# Patient Record
Sex: FEMALE | Race: ASIAN | NOT HISPANIC OR LATINO | Employment: FULL TIME | URBAN - METROPOLITAN AREA
[De-identification: names, ages, dates, MRNs, and addresses within clinical notes are randomized per-mention and may not be internally consistent; named-entity substitution may affect disease eponyms.]

---

## 2017-03-31 ENCOUNTER — COMMUNICATION - HEALTHEAST (OUTPATIENT)
Dept: FAMILY MEDICINE | Facility: CLINIC | Age: 32
End: 2017-03-31

## 2017-06-20 ENCOUNTER — COMMUNICATION - HEALTHEAST (OUTPATIENT)
Dept: FAMILY MEDICINE | Facility: CLINIC | Age: 32
End: 2017-06-20

## 2018-04-13 ENCOUNTER — OFFICE VISIT - HEALTHEAST (OUTPATIENT)
Dept: FAMILY MEDICINE | Facility: CLINIC | Age: 33
End: 2018-04-13

## 2018-04-13 DIAGNOSIS — H66.001 ACUTE SUPPURATIVE OTITIS MEDIA OF RIGHT EAR WITHOUT SPONTANEOUS RUPTURE OF TYMPANIC MEMBRANE, RECURRENCE NOT SPECIFIED: ICD-10-CM

## 2018-04-13 DIAGNOSIS — H61.22 IMPACTED CERUMEN OF LEFT EAR: ICD-10-CM

## 2018-07-23 ENCOUNTER — COMMUNICATION - HEALTHEAST (OUTPATIENT)
Dept: FAMILY MEDICINE | Facility: CLINIC | Age: 33
End: 2018-07-23

## 2019-01-16 ENCOUNTER — COMMUNICATION - HEALTHEAST (OUTPATIENT)
Dept: FAMILY MEDICINE | Facility: CLINIC | Age: 34
End: 2019-01-16

## 2019-07-04 ENCOUNTER — COMMUNICATION - HEALTHEAST (OUTPATIENT)
Dept: FAMILY MEDICINE | Facility: CLINIC | Age: 34
End: 2019-07-04

## 2019-07-08 ENCOUNTER — COMMUNICATION - HEALTHEAST (OUTPATIENT)
Dept: FAMILY MEDICINE | Facility: CLINIC | Age: 34
End: 2019-07-08

## 2019-08-16 ENCOUNTER — OFFICE VISIT - HEALTHEAST (OUTPATIENT)
Dept: FAMILY MEDICINE | Facility: CLINIC | Age: 34
End: 2019-08-16

## 2019-08-16 DIAGNOSIS — D48.5 NEOPLASM OF UNCERTAIN BEHAVIOR OF SKIN: ICD-10-CM

## 2019-08-16 DIAGNOSIS — L81.1 MELASMA: ICD-10-CM

## 2019-08-16 DIAGNOSIS — Z12.4 SCREENING FOR MALIGNANT NEOPLASM OF CERVIX: ICD-10-CM

## 2019-08-16 DIAGNOSIS — Z80.0 FAMILY HISTORY OF COLON CANCER: ICD-10-CM

## 2019-08-16 DIAGNOSIS — Z00.00 WELL ADULT EXAM: ICD-10-CM

## 2019-08-16 DIAGNOSIS — L70.8 OTHER ACNE: ICD-10-CM

## 2019-08-16 LAB — TSH SERPL DL<=0.005 MIU/L-ACNC: 0.53 UIU/ML (ref 0.3–5)

## 2019-08-16 ASSESSMENT — MIFFLIN-ST. JEOR: SCORE: 1278.29

## 2019-08-19 ENCOUNTER — RECORDS - HEALTHEAST (OUTPATIENT)
Dept: ADMINISTRATIVE | Facility: OTHER | Age: 34
End: 2019-08-19

## 2019-08-19 LAB
25(OH)D3 SERPL-MCNC: 19.1 NG/ML (ref 30–80)
25(OH)D3 SERPL-MCNC: 19.1 NG/ML (ref 30–80)
CHOLEST SERPL-MCNC: 211 MG/DL
HDL SERPL QN: 8.9 NM
HDL SERPL-SCNC: 38.8 UMOL/L
HDLC SERPL-MCNC: 61 MG/DL (ref 40–59)
HLD.LARGE SERPL-SCNC: 6.1 UMOL/L
LDL SERPL QN: 21.3 NM
LDL SERPL-SCNC: 1263 NMOL/L
LDL SMALL SERPL-SCNC: 336 NMOL/L
LDLC SERPL CALC-MCNC: 133 MG/DL
PATHOLOGY STUDY: ABNORMAL
TRIGL SERPL-MCNC: 87 MG/DL (ref 30–149)
VLDL LARGE SERPL-SCNC: 2.5 NMOL/L
VLDL SERPL QN: 44.7 NM

## 2019-08-20 LAB — THYROID PEROXIDASE ANTIBODIES - HISTORICAL: <3 IU/ML (ref 0–5.6)

## 2019-08-21 LAB
HPV SOURCE: NORMAL
HUMAN PAPILLOMA VIRUS 16 DNA: NEGATIVE
HUMAN PAPILLOMA VIRUS 18 DNA: NEGATIVE
HUMAN PAPILLOMA VIRUS FINAL DIAGNOSIS: NORMAL
HUMAN PAPILLOMA VIRUS OTHER HR: NEGATIVE
SPECIMEN DESCRIPTION: NORMAL

## 2019-08-22 ENCOUNTER — COMMUNICATION - HEALTHEAST (OUTPATIENT)
Dept: FAMILY MEDICINE | Facility: CLINIC | Age: 34
End: 2019-08-22

## 2019-08-27 ENCOUNTER — COMMUNICATION - HEALTHEAST (OUTPATIENT)
Dept: FAMILY MEDICINE | Facility: CLINIC | Age: 34
End: 2019-08-27

## 2019-09-17 ENCOUNTER — RECORDS - HEALTHEAST (OUTPATIENT)
Dept: ADMINISTRATIVE | Facility: OTHER | Age: 34
End: 2019-09-17

## 2019-11-05 ENCOUNTER — COMMUNICATION - HEALTHEAST (OUTPATIENT)
Dept: FAMILY MEDICINE | Facility: CLINIC | Age: 34
End: 2019-11-05

## 2019-11-05 DIAGNOSIS — Z78.9 USES BIRTH CONTROL: ICD-10-CM

## 2019-12-18 ENCOUNTER — RECORDS - HEALTHEAST (OUTPATIENT)
Dept: ADMINISTRATIVE | Facility: OTHER | Age: 34
End: 2019-12-18

## 2020-10-30 ENCOUNTER — RECORDS - HEALTHEAST (OUTPATIENT)
Dept: ADMINISTRATIVE | Facility: OTHER | Age: 35
End: 2020-10-30

## 2020-11-13 ENCOUNTER — COMMUNICATION - HEALTHEAST (OUTPATIENT)
Dept: FAMILY MEDICINE | Facility: CLINIC | Age: 35
End: 2020-11-13

## 2020-11-16 ENCOUNTER — COMMUNICATION - HEALTHEAST (OUTPATIENT)
Dept: FAMILY MEDICINE | Facility: CLINIC | Age: 35
End: 2020-11-16

## 2020-11-16 DIAGNOSIS — Z11.52 ENCOUNTER FOR SCREENING LABORATORY TESTING FOR COVID-19 VIRUS: ICD-10-CM

## 2020-12-16 ENCOUNTER — AMBULATORY - HEALTHEAST (OUTPATIENT)
Dept: LAB | Facility: CLINIC | Age: 35
End: 2020-12-16

## 2020-12-16 DIAGNOSIS — Z11.52 ENCOUNTER FOR SCREENING LABORATORY TESTING FOR COVID-19 VIRUS: ICD-10-CM

## 2020-12-17 ENCOUNTER — COMMUNICATION - HEALTHEAST (OUTPATIENT)
Dept: FAMILY MEDICINE | Facility: CLINIC | Age: 35
End: 2020-12-17

## 2020-12-18 ENCOUNTER — COMMUNICATION - HEALTHEAST (OUTPATIENT)
Dept: SCHEDULING | Facility: CLINIC | Age: 35
End: 2020-12-18

## 2020-12-18 ENCOUNTER — COMMUNICATION - HEALTHEAST (OUTPATIENT)
Dept: FAMILY MEDICINE | Facility: CLINIC | Age: 35
End: 2020-12-18

## 2021-01-07 ENCOUNTER — COMMUNICATION - HEALTHEAST (OUTPATIENT)
Dept: FAMILY MEDICINE | Facility: CLINIC | Age: 36
End: 2021-01-07

## 2021-01-14 ENCOUNTER — COMMUNICATION - HEALTHEAST (OUTPATIENT)
Dept: FAMILY MEDICINE | Facility: CLINIC | Age: 36
End: 2021-01-14

## 2021-01-14 ENCOUNTER — OFFICE VISIT - HEALTHEAST (OUTPATIENT)
Dept: FAMILY MEDICINE | Facility: CLINIC | Age: 36
End: 2021-01-14

## 2021-01-14 DIAGNOSIS — L71.0 PERIORAL DERMATITIS: ICD-10-CM

## 2021-01-14 RX ORDER — CLINDAMYCIN PHOSPHATE 10 MG/G
GEL TOPICAL
Qty: 60 G | Refills: 1 | Status: SHIPPED | OUTPATIENT
Start: 2021-01-14

## 2021-01-14 NOTE — ASSESSMENT & PLAN NOTE
"On off for a year  Initially thought is was clinque brand Facial cream  All over my face   Last 12/2020  Removed product and got better  \"little mouth and nose\"    Since irritation resolved   Persisted for months    Tried not tooth paste   Went without applying   Cleanser Salacylic acid did not help   Use   Plan   Clindamycin   Doxycycline     "

## 2021-01-29 ENCOUNTER — COMMUNICATION - HEALTHEAST (OUTPATIENT)
Dept: FAMILY MEDICINE | Facility: CLINIC | Age: 36
End: 2021-01-29

## 2021-01-29 ENCOUNTER — AMBULATORY - HEALTHEAST (OUTPATIENT)
Dept: FAMILY MEDICINE | Facility: CLINIC | Age: 36
End: 2021-01-29

## 2021-01-29 DIAGNOSIS — L30.9 DERMATITIS: ICD-10-CM

## 2021-02-01 ENCOUNTER — COMMUNICATION - HEALTHEAST (OUTPATIENT)
Dept: FAMILY MEDICINE | Facility: CLINIC | Age: 36
End: 2021-02-01

## 2021-02-01 DIAGNOSIS — L30.9 DERMATITIS: ICD-10-CM

## 2021-02-01 RX ORDER — HYDROCORTISONE 2.5 %
CREAM (GRAM) TOPICAL
Qty: 30 G | Refills: 1 | Status: SHIPPED | OUTPATIENT
Start: 2021-02-01

## 2021-05-25 ENCOUNTER — RECORDS - HEALTHEAST (OUTPATIENT)
Dept: ADMINISTRATIVE | Facility: CLINIC | Age: 36
End: 2021-05-25

## 2021-05-30 NOTE — TELEPHONE ENCOUNTER
RN cannot approve Refill Request    RN can NOT refill this medication PCP messaged that patient is overdue for Office Visit and Physical .       Lety Vasquez, [facility] Connection Triage/Med Refill 7/9/2019    Requested Prescriptions   Pending Prescriptions Disp Refills     TRI FEMYNOR 0.18/0.215/0.25 mg-35 mcg (28) Tab tablet [Pharmacy Med Name: TRI FEMYNOR 28 TABLET] 28 tablet 0     Sig: TAKE 1 TABLET DAILY AS DIRECTED. MUST SEE PROVIDER FOR PHYSICAL.       Oral Contraceptives Protocol Failed - 7/8/2019  8:56 PM        Failed - Visit with PCP or prescribing provider visit in last 12 months      Last office visit with prescriber/PCP: Visit date not found OR same dept: Visit date not found OR same specialty: 4/13/2018 David Salmon MD  Last physical: Visit date not found Last MTM visit: Visit date not found   Next visit within 3 mo: Visit date not found  Next physical within 3 mo: Visit date not found  Prescriber OR PCP: Mary Edouard MD  Last diagnosis associated with med order: 1. Contraception  - TRI FEMYNOR 0.18/0.215/0.25 mg-35 mcg (28) Tab tablet [Pharmacy Med Name: TRI FEMYNOR 28 TABLET]; TAKE 1 TABLET DAILY AS DIRECTED. MUST SEE PROVIDER FOR PHYSICAL.  Dispense: 28 tablet; Refill: 0    If protocol passes may refill for 12 months if within 3 months of last provider visit (or a total of 15 months).

## 2021-05-31 ENCOUNTER — RECORDS - HEALTHEAST (OUTPATIENT)
Dept: ADMINISTRATIVE | Facility: CLINIC | Age: 36
End: 2021-05-31

## 2021-05-31 NOTE — PATIENT INSTRUCTIONS - HE
"Follow through with colorectal surgery Dr. Monae Harrell for colorectal screening    See Dr. Serene Saunders dermatology she can evaluate the mole on your upper chest  Start Tri-Odalis apply thin film to the upper lip    Look at [facility] for pricing for Tri-Odalis next line      Focused Nutrition:  Eat Clean and Whole Foods  These are single ingredient foods that possess vital nutrients which can touch and affect our health in a positive manner.            Lean Meats Protein and Fat---- Nuts, Eggs,Veggies, Fish and Lean Meats especially fish and poultry. Meat and Eggs in their natural form have No Sugar.  Try to choose good fat from Fish, Nuts, Avocados, Extra Virgin Olive Oil (not cooked) other vegetables for example. Opt for Plant Proteins and Fats over Animal Fats      For High Protein Eat---- Meats, Fish, Lean Meats, Beans, Nuts, and Quinoa/ other Whole grains    Focus on \"Whole Foods\":  You know what the food is by looking at it and comes from a plant, tree, animal or the sea.  Single source organically grown if possible.    Look for Low Glycemic foods:  Try to Avoid foods with any added sugars and absolutely  no High Fructose Corn Syrup    Lean Meats Protein and Fat: These in their natural form have No Sugar.  Go for the good fat from Fish, Nuts, Avocados, Extra Virgin Olive Oil (not cooked) other vegetables for example. Opt for Plant Proteins and Fats over Animal Fats    Beans are excellent complex carbohydrates with fiber- black, garbanzo, lentil, kidney, lima, Ree, West    Simple flours and breads, sigh,  in general are simple sugars, when processed and should be avoided.  However there are beneficial fibers in wholes grains.  So if choosing, go for the 100% whole-grain Grains --Bran, brown rice, Flax----Fiber offsets glycemic affect    Vegetables: most vegetables are low glycemic with the exception of starchy ones:  potatoes, carrots, corn, and beats    Whole Fruits mostly are low glycemic:  try to eat his " snacks: Try not to pair with fat.  Insulin burns sugar and stores fat as a job     High glycemic fruits: Dates, Watermelon, Figgs, Apricots, Raisins but in moderation OK    For Snacks go for nuts and seeds unless you are instructed not to do so due to another health condition; please although tempting avoid corn chips, jellybeans, pretzels other processed snacks that usually pair sugar and fat.      Probiotics and Prebiotics support digestion and our immune system!    Foods that support this are, among others:    Pros: Kefir, Kombucha, Miso, Pickled Vegetables, Brayan Kraut, Yogurt, Tempeh,     Pre: Asparagus, Bananas, Eggplant, Garlic, Honey, Kefir, Leeks, Legumes, Onions, Peas, Whole Grains, and Yogurt    Nutrients support our cellular machinery:  High nutrient food support this.    Rest helps digest.  We need restorative sleep  8- 10 hours.  We should also try to have 10 to 12 hours at some point between meals, e.g. 7 PM to 7 AM     Fiber: Rocky Seed or Flax Seed or Hemp Seeds:  2-3 tablespoons daily for fiber and Omega 3s      Move everyday your body and sweat!    Get good 8 to 10 hours of Sleep and Hydrate with Water      If your Triglycerides are High:    Look into a Ketogenic Diet    Always choose --No added Sugar..    Fish Oil 2000 mg Daily and/or Flax or Rocky seeds  Milled 2 tablespoons                            Daily    Rocky seed in [place] Milk over night to make pudding    Nuts Especially walnuts.    Fish especially  [place], Mackerel, Anchovy,Sardine and Herring    Vegetables opt for multiple colors daily  New Goal 3- 5 cups of fruits and                         veggies Daily!!      Fermented Foods Rock!  You can do your own preserving and culturing of good bacteria!      Drink fermented Kombuchaa  Eat Cultured vegetable like Kimchi or Sauerkraut  Apple Cider Vinegar Unfiltered can be added 8 oz fizzy water  Foods:  Beets, Celery, Lemon, Lime, Grapefruit, Cucumber and Carrots    Use Bitter Herbs:  Catherine,  "Arugala, Cilantro, Turmeric, Dandelion, CUmin, Fennel, Mint, Leeks, Parsley, and Milk THistle    Practice Fastin hours from last meal in evening to first meal in morming    Chlorophyll Rich Foods may help, add to water CHorella or Spirulina    Eat Kale and Broccoli Sprouts --- Sulfurophane rich      Eat Cruciferous Vegetables Daily:    Broccoli, Cauliflower, Kale, Collards, Brussel Sprouts    Eat Lots of Fiber:      Soluble:   Rocky, Flax Hemp, Pumpkin Seeds  Insoluble:  Fruits and Veggies  Best sources of  Chicory Root Ground, Dandelion Root, Asparagus, Leeks and Onion, Bananas ( more green), Sprouted Wheat eg Josep Bread , Garlic, [place] Artichokes,)       Lastly we have to think of things that are toxic to our health, which may contribute to poor health and disease.  An apple covered in pesticides has both healthy and toxic components for our system.  The very \"healthy choices\" may be laced with toxins.  So choose foods wisely and discriminatingly.      Here are some recommendations about when and when not to choose organic foods.  When in doubt wash!      The group identified the following items on its  Dirty Dozen  list of produce with the most pesticide residue:   1. Strawberries  2. Spinach  3. Nectarines  4. Apples  5. Grapes  6. Peaches  7. Cherries  8. Pears  9. Tomatoes  10. Celery  11. Potatoes  12. Sweet Bell Peppers  Here are the items the EWG identified for its  Clean 15,  which report the least likelihood to contain pesticide residue.  1. Avocados  2. Sweet Corn  3. Pineapples  4. Cabbages  5. Onions  6. Sweet Peas  7. Papayas  8. Asparagus  9. Mangoes  10. Eggplants  11. Honeydews  12. Kiwis  13. Cantaloupes  14. Cauliflower  15. Broccoli          Eat well, Get Good Sleep and Stay Active!    DanL    "

## 2021-05-31 NOTE — PROGRESS NOTES
"  ASSESSMENT & PLAN    No problem-specific Assessment & Plan notes found for this encounter.      Keisha was seen today for annual exam.    Diagnoses and all orders for this visit:    Screening for malignant neoplasm of cervix  -     Gynecologic Cytology (PAP Smear)    Other acne  -     fluocinolone-hydroq.-tretinoin 0.01-4-0.05 % Crea; Apply thin film To perioral area nightly  -     Ambulatory referral to Dermatology    Family history of colon cancer  -     Ambulatory referral to Colorectal Surgery    Neoplasm of uncertain behavior of skin  -     Ambulatory referral to Dermatology    Melasma  -     Ambulatory referral to Dermatology    Well adult exam  -     Thyroid Cascade  -     Thyroid Peroxidase Antibody  -     LipoFit by NMR  -     Vitamin D, Total (25-Hydroxy)        Patient Instructions   Follow through with colorectal surgery Dr. Monae Harrell for colorectal screening    See Dr. Serene Saunders dermatology she can evaluate the mole on your upper chest  Start Tri-Odalis apply thin film to the upper lip    Look at [facility] for pricing for Tri-Odalis next line      Focused Nutrition:  Eat Clean and Whole Foods  These are single ingredient foods that possess vital nutrients which can touch and affect our health in a positive manner.            Lean Meats Protein and Fat---- Nuts, Eggs,Veggies, Fish and Lean Meats especially fish and poultry. Meat and Eggs in their natural form have No Sugar.  Try to choose good fat from Fish, Nuts, Avocados, Extra Virgin Olive Oil (not cooked) other vegetables for example. Opt for Plant Proteins and Fats over Animal Fats      For High Protein Eat---- Meats, Fish, Lean Meats, Beans, Nuts, and Quinoa/ other Whole grains    Focus on \"Whole Foods\":  You know what the food is by looking at it and comes from a plant, tree, animal or the sea.  Single source organically grown if possible.    Look for Low Glycemic foods:  Try to Avoid foods with any added sugars and absolutely  no High Fructose " Corn Syrup    Lean Meats Protein and Fat: These in their natural form have No Sugar.  Go for the good fat from Fish, Nuts, Avocados, Extra Virgin Olive Oil (not cooked) other vegetables for example. Opt for Plant Proteins and Fats over Animal Fats    Beans are excellent complex carbohydrates with fiber- black, garbanzo, lentil, kidney, lima, Ree, West    Simple flours and breads, sigh,  in general are simple sugars, when processed and should be avoided.  However there are beneficial fibers in wholes grains.  So if choosing, go for the 100% whole-grain Grains --Bran, brown rice, Flax----Fiber offsets glycemic affect    Vegetables: most vegetables are low glycemic with the exception of starchy ones:  potatoes, carrots, corn, and beats    Whole Fruits mostly are low glycemic:  try to eat his snacks: Try not to pair with fat.  Insulin burns sugar and stores fat as a job     High glycemic fruits: Dates, Watermelon, Figgs, Apricots, Raisins but in moderation OK    For Snacks go for nuts and seeds unless you are instructed not to do so due to another health condition; please although tempting avoid corn chips, jellybeans, pretzels other processed snacks that usually pair sugar and fat.      Probiotics and Prebiotics support digestion and our immune system!    Foods that support this are, among others:    Pros: Kefir, Kombucha, Miso, Pickled Vegetables, Brayan Kraut, Yogurt, Tempeh,     Pre: Asparagus, Bananas, Eggplant, Garlic, Honey, Kefir, Leeks, Legumes, Onions, Peas, Whole Grains, and Yogurt    Nutrients support our cellular machinery:  High nutrient food support this.    Rest helps digest.  We need restorative sleep  8- 10 hours.  We should also try to have 10 to 12 hours at some point between meals, e.g. 7 PM to 7 AM     Fiber: Rocky Seed or Flax Seed or Hemp Seeds:  2-3 tablespoons daily for fiber and Omega 3s      Move everyday your body and sweat!    Get good 8 to 10 hours of Sleep and Hydrate with Water      If  "your Triglycerides are High:    Look into a Ketogenic Diet    Always choose --No added Sugar..    Fish Oil 2000 mg Daily and/or Flax or Rocky seeds  Milled 2 tablespoons                            Daily    Rocky seed in [place] Milk over night to make pudding    Nuts Especially walnuts.    Fish especially  [place], Mackerel, Anchovy,Sardine and Herring    Vegetables opt for multiple colors daily  New Goal 3- 5 cups of fruits and                         veggies Daily!!      Fermented Foods Rock!  You can do your own preserving and culturing of good bacteria!      Drink fermented Kombuchaa  Eat Cultured vegetable like Kimchi or Sauerkraut  Apple Cider Vinegar Unfiltered can be added 8 oz fizzy water  Foods:  Beets, Celery, Lemon, Lime, Grapefruit, Cucumber and Carrots    Use Bitter Herbs:  Catherine, Arugala, Cilantro, Turmeric, Dandelion, CUmin, Fennel, Mint, Leeks, Parsley, and Milk THistle    Practice Fastin hours from last meal in evening to first meal in morming    Chlorophyll Rich Foods may help, add to water CHorella or Spirulina    Eat Kale and Broccoli Sprouts --- Sulfurophane rich      Eat Cruciferous Vegetables Daily:    Broccoli, Cauliflower, Kale, Collards, Brussel Sprouts    Eat Lots of Fiber:      Soluble:   Rocky, Flax Hemp, Pumpkin Seeds  Insoluble:  Fruits and Veggies  Best sources of  Chicory Root Ground, Dandelion Root, Asparagus, Leeks and Onion, Bananas ( more green), Sprouted Wheat eg Josep Bread , Garlic, [place] Artichokes,)       Lastly we have to think of things that are toxic to our health, which may contribute to poor health and disease.  An apple covered in pesticides has both healthy and toxic components for our system.  The very \"healthy choices\" may be laced with toxins.  So choose foods wisely and discriminatingly.      Here are some recommendations about when and when not to choose organic foods.  When in doubt wash!      The group identified the following items on its  Dirty " Dozen  list of produce with the most pesticide residue:   1. Strawberries  2. Spinach  3. Nectarines  4. Apples  5. Grapes  6. Peaches  7. Cherries  8. Pears  9. Tomatoes  10. Celery  11. Potatoes  12. Sweet Bell Peppers  Here are the items the [facility] identified for its  Clean 15,  which report the least likelihood to contain pesticide residue.  1. Avocados  2. Sweet Corn  3. Pineapples  4. Cabbages  5. Onions  6. Sweet Peas  7. Papayas  8. Asparagus  9. Mangoes  10. Eggplants  11. Honeydews  12. Kiwis  13. Cantaloupes  14. Cauliflower  15. Broccoli          Eat well, Get Good Sleep and Stay Active!    DanL        Return in about 1 year (around 8/16/2020).       Little interest or pleasure in doing things: Not at all  Feeling down, depressed, or hopeless: Not at all    CHIEF COMPLAINT: Keisha Godoy had concerns including Annual Exam (Mole on chest).    [place]: 1.............. had concerns including Annual Exam (Mole on chest).    1. Screening for malignant neoplasm of cervix    2. Other acne    3. Family history of colon cancer    4. Neoplasm of uncertain behavior of skin    5. Melasma    6. Well adult exam          CC:             Why are you here today?                              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 female    No past medical history on file.  Past Surgical History:   Procedure Laterality Date     CA REPAIR  TYMPANIC MEMBRANE      Description: Tympanic Membrane Repair;  Recorded: 10/10/2014;     Patient has no known allergies.  Current Outpatient Medications   Medication Sig Dispense Refill     TRI FEMYNOR 0.18/0.215/0.25 mg-35 mcg (28) Tab tablet TAKE 1 TABLET DAILY AS DIRECTED. MUST SEE PROVIDER FOR PHYSICAL. 84 tablet 0     fluocinolone-hydroq.-tretinoin 0.01-4-0.05 % Crea Apply thin film To perioral area nightly 30 g 2     No current facility-administered medications for this visit.      No family history on file.  Social History     Socioeconomic History     Marital status: Single     Spouse name: None     Number of children: None     Years of education: None     Highest education level: None   Occupational History     None   Social Needs     Financial resource strain: None     Food insecurity:     Worry: None     Inability: None     Transportation needs:     Medical: None     Non-medical: None   Tobacco Use     Smoking status: Never Smoker     Smokeless tobacco: Never Used   Substance and Sexual Activity     Alcohol use: No     Drug use: No     Sexual activity: None   Lifestyle     Physical activity:     Days per week: None     Minutes per session: None     Stress: None   Relationships     Social connections:     Talks on phone: None     Gets together: None     Attends Sikhism service: None     Active member of club or organization: None     Attends meetings of clubs or organizations: None     Relationship status: None     Intimate partner violence:     Fear of current or ex partner: None     Emotionally abused: None     Physically abused: None     Forced sexual activity: None   Other Topics Concern     None   Social History Narrative     None     Patient Active Problem List   Diagnosis   (none) - all problems resolved or deleted                                              SOCIAL: She  reports that she has never smoked. She has never used smokeless tobacco. She reports that she does not drink alcohol or use  "drugs.    REVIEW OF SYSTEMS:   Family history not pertinent to chief complaint or presenting problem    Review of Systems:      Nervous System:  No headache, paresthesia or dizziness or fainting                                  Ears: No hearing loss or ringing in the ears    Eyes: No blurring of vision, Double Vision            No redness, itching or dryness.    Nose: No nosebleed or loss of smell    Mouth: No mouth sores or  coated tongue    Throat: No hoarseness or difficulty swallowing    Neck: No enlarged thyroid or lymph nodes.    Heart: No chest pain, palpitation or irregular heartbeat.                  Lungs: No shortness of breath, wheezing or hemoptysis.    Gastrointestinal: No nausea or vomiting, melena or blood in stools.    Kidney/Bladdr: No polyuria, polydipsia, or hematuria.                             Genital/Sexual: No Sex function Changes                                Skin: No rash acne changes upper lip with some hyperpigmentation    Muscles/Joints/Bones: No Muscle morning stiffness, No Effusion of a Joint     Review of systems otherwise negative as requested from patient, except   Those positive ROS outlined and discussed in [place].    OBJECTIVE:  /80 (Patient Site: Right Arm, Patient Position: Sitting, Cuff Size: Adult Regular)   Pulse 73   Ht 5' 2\" (1.575 m)   Wt 140 lb (63.5 kg)   LMP 07/29/2019   SpO2 100%   Breastfeeding? No   BMI 25.61 kg/m      GENERAL:     No acute distress.   Alert and oriented X 3         Physical:        Physical:  General Appearance: Healthy-appearingy.   Head:  No deformity  Eyes: Sclerae white, pupils equal and reactive, extra ocular movements intact   Ears: Well-positioned, well-formed pinnae; TM pearly white, translucent, no bulging   Nose: Clear, normal mucosa no drainage or crusting  Throat: Lips, tongue, and mucosa are moist, pink and intact; tongue no thrush oral pharynx no injection or lesions  Neck: Supple, symmetric ROM no nodes   No carotid " Buits  Chest: Lungs clear to auscultation, no retractions  Heart: Regular rate & rhythm, S1 S2, no murmur  Abdomen: Soft, non-tender, no masses; umbilical area normal   Pulses: Equal femoral pulses  : No hernia palpable external genitalia without lesions Pap smear is parous looking cervix Pap smear taken bimanual exam no adnexal fullness or tenderness anteverted uterus  Extremities: Well-perfused, warm and dry, No Edema  Palpable Pulses Bilateral  Neuro: Easily aroused good tone NO tremor   Skin  No Rash hyperpigmentation changes upper lips with some mild acneiform changes  Slightly irregular mole 4 mm x 3 mm upper left clavicle by pigmented        ASSESSMENT & PLAN      Keisha was seen today for annual exam.    Diagnoses and all orders for this visit:    Screening for malignant neoplasm of cervix  -     Gynecologic Cytology (PAP Smear)    Other acne  -     fluocinolone-hydroq.-tretinoin 0.01-4-0.05 % Crea; Apply thin film To perioral area nightly  -     Ambulatory referral to Dermatology    Family history of colon cancer  -     Ambulatory referral to Colorectal Surgery    Neoplasm of uncertain behavior of skin  -     Ambulatory referral to Dermatology    Melasma  -     Ambulatory referral to Dermatology    Well adult exam  -     Thyroid Cascade  -     Thyroid Peroxidase Antibody  -     LipoFit by NMR  -     Vitamin D, Total (25-Hydroxy)        Return in about 1 year (around 8/16/2020).       Anticipatory Guidance and Symptomatic Cares Discussed   Advised to call back directly if there are further questions, or if these symptoms fail to improve as anticipated or worsen.  Return to clinic if patient has a clinical concern that warrants an exam.         I spent 25  minutes with this patient face to face, of which 50% or greater was spent in counseling and coordination of care with regards to Keisha was seen today for annual exam.    Diagnoses and all orders for this visit:    Screening for malignant neoplasm of  cervix  -     Gynecologic Cytology (PAP Smear)    Other acne  -     fluocinolone-hydroq.-tretinoin 0.01-4-0.05 % Crea; Apply thin film To perioral area nightly  -     Ambulatory referral to Dermatology    Family history of colon cancer  -     Ambulatory referral to Colorectal Surgery    Neoplasm of uncertain behavior of skin  -     Ambulatory referral to Dermatology    Melasma  -     Ambulatory referral to Dermatology    Well adult exam  -     Thyroid Cascade  -     Thyroid Peroxidase Antibody  -     LipoFit by NMR  -     Vitamin D, Total (25-Hydroxy)        David Salmon MD  Family Medicine   [facility] [id number] [phone]

## 2021-06-01 ENCOUNTER — RECORDS - HEALTHEAST (OUTPATIENT)
Dept: ADMINISTRATIVE | Facility: CLINIC | Age: 36
End: 2021-06-01

## 2021-06-01 VITALS — HEIGHT: 63 IN

## 2021-06-03 VITALS — BODY MASS INDEX: 25.76 KG/M2 | WEIGHT: 140 LBS | HEIGHT: 62 IN

## 2021-06-03 NOTE — TELEPHONE ENCOUNTER
Refill Approved    Rx renewed per Medication Renewal Policy. Medication was last renewed on 8.16.19.    Madhuri Alvarez, [facility] Connection Triage/Med Refill 11/5/2019     Requested Prescriptions   Pending Prescriptions Disp Refills     TRI FEMYNOR 0.18/0.215/0.25 mg-35 mcg (28) tablet [Pharmacy Med Name: TRI FEMYNOR 28 TABLET] 84 tablet 0     Sig: TAKE 1 TABLET DAILY AS DIRECTED. MUST SEE PROVIDER FOR PHYSICAL.       Oral Contraceptives Protocol Passed - 11/5/2019  1:51 AM        Passed - Visit with PCP or prescribing provider visit in last 12 months      Last office visit with prescriber/PCP: Visit date not found OR same dept: Visit date not found OR same specialty: 4/13/2018 David Salmon MD  Last physical: Visit date not found Last MTM visit: Visit date not found   Next visit within 3 mo: Visit date not found  Next physical within 3 mo: Visit date not found  Prescriber OR PCP: Jo Bryant MD  Last diagnosis associated with med order: 1. Uses birth control  - TRI FEMYNOR 0.18/0.215/0.25 mg-35 mcg (28) tablet [Pharmacy Med Name: TRI FEMYNOR 28 TABLET]; TAKE 1 TABLET DAILY AS DIRECTED. MUST SEE PROVIDER FOR PHYSICAL.  Dispense: 84 tablet; Refill: 0    If protocol passes may refill for 12 months if within 3 months of last provider visit (or a total of 15 months).

## 2021-06-13 NOTE — TELEPHONE ENCOUNTER
----- Message from David Salmon MD sent at 11/16/2020  8:29 AM CST -----  PLease set up a lab only for all family members 5 days before travel dates. Eg if leaving 12/15 do test 12/11        Jayshree

## 2021-06-13 NOTE — TELEPHONE ENCOUNTER
Patient scheduled for lab-only 12/16/2020 @ 8:30 AM. Patient asking to order high-priority. Thanks.

## 2021-06-14 NOTE — PATIENT INSTRUCTIONS - HE
Perioral dermatitis  Unclear trigger  Doxycycline 100 mg twice daily for 14 days  Clindamycin topically twice daily    Follow-up via MyChart in 10 days plan on doing therapy for at least 30 days  Try to identify any triggers

## 2021-06-14 NOTE — PROGRESS NOTES
"  Perioral dermatitis  On off for a year  Initially thought is was clinque brand Facial cream  All over my face   Last 12/2020  Removed product and got better  \"little mouth and nose\"    Since irritation resolved   Persisted for months    Tried not tooth paste   Went without applying   Cleanser Salacylic acid did not help   Use   Plan   Clindamycin   Doxycycline         Keisha Godoy is a 35 y.o. female who is being evaluated via a billable telephone visit.      The patient has been notified of following:     \"This telephone visit will be conducted via a call between you and your physician/provider. We have found that certain health care needs can be provided without the need for a physical exam.  This service lets us provide the care you need with a short phone conversation.  If a prescription is necessary we can send it directly to your pharmacy.  If lab work is needed we can place an order for that and you can then stop by our lab to have the test done at a later time.    If during the course of the call the physician/provider feels a telephone visit is not appropriate, you will not be charged for this service.\"     Physician has received verbal consent for a Telephone Visit from the patient? Yes    Keisha Godoy complains of    Chief Complaint   Patient presents with     Rash     around mouth X1Year on and off        I have reviewed and updated the patient's Past Medical History, Social History, Family History and Medication List.    ALLERGIES  Patient has no known allergies.    Additional provider notes: insert own note template here  See note     Problem List Items Addressed This Visit     Perioral dermatitis - Primary     On off for a year  Initially thought is was clinque brand Facial cream  All over my face   Last 12/2020  Removed product and got better  \"little mouth and nose\"    Since irritation resolved   Persisted for months    Tried not tooth paste   Went without applying   Cleanser Salacylic acid did not " help   Use   Plan   Clindamycin   Doxycycline            Relevant Medications    doxycycline (VIBRAMYCIN) 100 MG capsule    clindamycin (CLINDAGEL) 1 % gel            Assessment/Plan:  1. Perioral dermatitis  Send an update via MyChart picture in 10 days plan on doing clindamycin for 1 month  - doxycycline (VIBRAMYCIN) 100 MG capsule; Take 1 capsule (100 mg total) by mouth 2 (two) times a day for 14 days.  Dispense: 28 capsule; Refill: 0  - clindamycin (CLINDAGEL) 1 % gel; Apply to affected area 2 times daily  Dispense: 60 g; Refill: 1        Phone call duration:  11 minutes    David Salmon MD

## 2021-06-16 PROBLEM — Z80.0 FAMILY HISTORY OF COLON CANCER: Status: ACTIVE | Noted: 2019-08-22

## 2021-06-16 PROBLEM — L71.0 PERIORAL DERMATITIS: Status: ACTIVE | Noted: 2021-01-14

## 2021-06-17 NOTE — PROGRESS NOTES
"ASSESSMENT & PLAN      Keisha was seen today for ear problem.    Diagnoses and all orders for this visit:    Impacted cerumen of left ear  -     Ear cerumen removal; Future    Acute suppurative otitis media of right ear without spontaneous rupture of tympanic membrane, recurrence not specified    Other orders  -     ciprofloxacin-dexamethasone (CIPRODEX) otic suspension; 3 ML Twice Daily for 7 days      No Follow-up on file.           CHIEF COMPLAINT: Keisha Godoy had concerns including Ear Problem.    [place]: 1.............. had concerns including Ear Problem.    1. Impacted cerumen of left ear    2. Acute suppurative otitis media of right ear without spontaneous rupture of tympanic membrane, recurrence not specified      No problem-specific Assessment & Plan notes found for this encounter.      CC:             Ear drainage last 4 weeks  Describes that she had \"sensation like something was in there her  used a curette to to get it out and felt fine the first day the next day she started having drainage and a little bit of discomfort she has occasional popping she can feel air moving and popping when she leans her head forward  No sensitivity to air  Mild amount of drainage she sometimes has to workout at night  No fever  No jaw pain  Prior tympanic membrane surgery on that side      What's it like:                     Drainage  How long is it ongoin weeks  What makes it worse :       Started after curetting  What makes it better:       Limited better with cotton  0/10-10/10:Pain/Intesity     mild      Any associated Sx to above complaint:   No jaw pain no recent upper respiratory infection no allergies        SUBJECTIVE:  Keisha Godoy is a 33 y.o. female    No past medical history on file.  Past Surgical History:   Procedure Laterality Date     NC REPAIR TYMPANIC MEMBRANE      Description: Tympanic Membrane Repair;  Recorded: 10/10/2014;     Review of patient's allergies indicates no known " "allergies.  Current Outpatient Prescriptions   Medication Sig Dispense Refill     TRI-PREVIFEM, 28, 0.18/0.215/0.25 mg-35 mcg (28) Tab tablet TAKE 1 TABLET DAILY AS DIRECTED. MUST SEE PROVIDER FOR PHYSICAL. 84 tablet 5     ciprofloxacin-dexamethasone (CIPRODEX) otic suspension 3 ML Twice Daily for 7 days 7.5 mL 0     No current facility-administered medications for this visit.      No family history on file.  Social History     Social History     Marital status: Single     Spouse name: N/A     Number of children: N/A     Years of education: N/A     Social History Main Topics     Smoking status: Never Smoker     Smokeless tobacco: Never Used     Alcohol use No     Drug use: No     Sexual activity: Not Asked     Other Topics Concern     None     Social History Narrative     Patient Active Problem List   Diagnosis   (none) - all problems resolved or deleted                                              SOCIAL: She  reports that she has never smoked. She has never used smokeless tobacco. She reports that she does not drink alcohol or use illicit drugs.    REVIEW OF SYSTEMS:   Family history not pertinent to chief complaint or presenting problem    Review of systems otherwise negative as requested from patient, except   Those positive ROS outlined and discussed in [place].    OBJECTIVE:  BP 98/60 (Patient Site: Left Arm, Patient Position: Sitting, Cuff Size: Adult Regular)  Pulse 70  Ht 5' 3\" (1.6 m)    GENERAL:     No acute distress.   Alert and oriented X 3         Physical:    Sclera clear  Left TM has what wax  Drum looks clear  Right TM erythema along the ridge just proximal to the tympanic membrane the template tympanic membrane itself is opaque and slightly erythematous  I do not appreciate a perforation I am unable to remove the  left earwax  With Valsalva she does not blow bubbles  No cervical or subclavicular nodes  Oropharynx is clear  Thyroid prep nontender without nodules      ASSESSMENT & PLAN      Keisha was " seen today for ear problem.    Diagnoses and all orders for this visit:    Impacted cerumen of left ear  -     Ear cerumen removal; Future    Acute suppurative otitis media of right ear without spontaneous rupture of tympanic membrane, recurrence not specified    Other orders  -     ciprofloxacin-dexamethasone (CIPRODEX) otic suspension; 3 ML Twice Daily for 7 days      No Follow-up on file.       Anticipatory Guidance and Symptomatic Cares Discussed   Advised to call back directly if there are further questions, or if these symptoms fail to improve as anticipated or worsen.  Return to clinic if patient has a clinical concern that warrants an exam.        20  Min Total Time, > 50% counseling and coordination of Care    David Salmon MD  Family Medicine   [facility] [id number] [phone]

## 2021-06-23 NOTE — TELEPHONE ENCOUNTER
RN cannot approve Refill Request-OVERDUE PHYSICAL    RN can NOT refill this medication Protocol failed and NO refill given. Last office visit: 4/13/2018 David Salmon MD Last Physical: Visit date not found Last MTM visit: Visit date not found Last visit same specialty: 4/13/2018 David Salmon MD.  Next visit within 3 mo: Visit date not found  Next physical within 3 mo: Visit date not found      Madhuri Alvarez, [facility] Triage/Med Refill 1/16/2019    Requested Prescriptions   Pending Prescriptions Disp Refills     TRI FEMYNOR 0.18/0.215/0.25 mg-35 mcg (28) Tab tablet [Pharmacy Med Name: TRI FEMYNOR 28 TABLET] 84 tablet 1     Sig: TAKE 1 TABLET DAILY AS DIRECTED. MUST SEE PROVIDER FOR PHYSICAL.    Oral Contraceptives Protocol Passed - 1/16/2019  1:21 AM       Passed - Visit with PCP or prescribing provider visit in last 12 months     Last office visit with prescriber/PCP: 4/13/2018 David Salmon MD OR same dept: 4/13/2018 David Salmon MD OR same specialty: 4/13/2018 David Salmon MD  Last physical: Visit date not found Last MTM visit: Visit date not found   Next visit within 3 mo: Visit date not found  Next physical within 3 mo: Visit date not found  Prescriber OR PCP: David Salmon MD  Last diagnosis associated with med order: 1. Contraception  - TRI FEMYNOR 0.18/0.215/0.25 mg-35 mcg (28) Tab tablet [Pharmacy Med Name: TRI FEMYNOR 28 TABLET]; TAKE 1 TABLET DAILY AS DIRECTED. MUST SEE PROVIDER FOR PHYSICAL.  Dispense: 84 tablet; Refill: 1    If protocol passes may refill for 12 months if within 3 months of last provider visit (or a total of 15 months).

## 2021-06-27 ENCOUNTER — HEALTH MAINTENANCE LETTER (OUTPATIENT)
Age: 36
End: 2021-06-27

## 2021-07-03 NOTE — ADDENDUM NOTE
Addendum Note by Kristofer Russo CNP at 11/18/2020  5:25 PM     Author: Kristofer Russo CNP Service: -- Author Type: Nurse Practitioner    Filed: 11/18/2020  5:25 PM Encounter Date: 11/16/2020 Status: Signed    : Kristofer Russo CNP (Nurse Practitioner)    Addended by: KRISTOFER RUSSO on: 11/18/2020 05:25 PM        Modules accepted: Orders

## 2021-10-17 ENCOUNTER — HEALTH MAINTENANCE LETTER (OUTPATIENT)
Age: 36
End: 2021-10-17

## 2022-02-07 ENCOUNTER — TELEPHONE (OUTPATIENT)
Dept: FAMILY MEDICINE | Facility: CLINIC | Age: 37
End: 2022-02-07

## 2022-07-24 ENCOUNTER — HEALTH MAINTENANCE LETTER (OUTPATIENT)
Age: 37
End: 2022-07-24

## 2022-08-12 ENCOUNTER — IMMUNIZATION (OUTPATIENT)
Dept: NURSING | Facility: CLINIC | Age: 37
End: 2022-08-12
Payer: COMMERCIAL

## 2022-08-12 PROCEDURE — 91305 COVID-19,PF,PFIZER (12+ YRS): CPT

## 2022-08-12 PROCEDURE — 0054A COVID-19,PF,PFIZER (12+ YRS): CPT

## 2022-10-02 ENCOUNTER — HEALTH MAINTENANCE LETTER (OUTPATIENT)
Age: 37
End: 2022-10-02

## 2023-08-12 ENCOUNTER — HEALTH MAINTENANCE LETTER (OUTPATIENT)
Age: 38
End: 2023-08-12

## 2023-12-30 ENCOUNTER — HEALTH MAINTENANCE LETTER (OUTPATIENT)
Age: 38
End: 2023-12-30

## 2025-01-18 ENCOUNTER — HEALTH MAINTENANCE LETTER (OUTPATIENT)
Age: 40
End: 2025-01-18

## 2025-02-16 ENCOUNTER — HEALTH MAINTENANCE LETTER (OUTPATIENT)
Age: 40
End: 2025-02-16

## 2025-04-28 ENCOUNTER — LAB REQUISITION (OUTPATIENT)
Dept: LAB | Facility: CLINIC | Age: 40
End: 2025-04-28

## 2025-04-28 DIAGNOSIS — L65.9 NONSCARRING HAIR LOSS, UNSPECIFIED: ICD-10-CM

## 2025-04-28 DIAGNOSIS — Z13.21 ENCOUNTER FOR SCREENING FOR NUTRITIONAL DISORDER: ICD-10-CM

## 2025-04-28 DIAGNOSIS — Z01.419 ENCOUNTER FOR GYNECOLOGICAL EXAMINATION (GENERAL) (ROUTINE) WITHOUT ABNORMAL FINDINGS: ICD-10-CM

## 2025-04-28 DIAGNOSIS — Z13.220 ENCOUNTER FOR SCREENING FOR LIPOID DISORDERS: ICD-10-CM

## 2025-04-28 PROCEDURE — 83550 IRON BINDING TEST: CPT | Performed by: FAMILY MEDICINE

## 2025-04-28 PROCEDURE — 82728 ASSAY OF FERRITIN: CPT | Performed by: FAMILY MEDICINE

## 2025-04-28 PROCEDURE — 82172 ASSAY OF APOLIPOPROTEIN: CPT | Performed by: FAMILY MEDICINE

## 2025-04-28 PROCEDURE — 87624 HPV HI-RISK TYP POOLED RSLT: CPT | Performed by: FAMILY MEDICINE

## 2025-04-28 PROCEDURE — 84443 ASSAY THYROID STIM HORMONE: CPT | Performed by: FAMILY MEDICINE

## 2025-04-28 PROCEDURE — 80053 COMPREHEN METABOLIC PANEL: CPT | Performed by: FAMILY MEDICINE

## 2025-04-28 PROCEDURE — 84630 ASSAY OF ZINC: CPT | Performed by: FAMILY MEDICINE

## 2025-04-28 PROCEDURE — 82306 VITAMIN D 25 HYDROXY: CPT | Performed by: FAMILY MEDICINE

## 2025-04-28 PROCEDURE — G0145 SCR C/V CYTO,THINLAYER,RESCR: HCPCS | Performed by: FAMILY MEDICINE

## 2025-04-28 PROCEDURE — 80061 LIPID PANEL: CPT | Performed by: FAMILY MEDICINE

## 2025-04-29 LAB
ALBUMIN SERPL BCG-MCNC: 4.6 G/DL (ref 3.5–5.2)
ALP SERPL-CCNC: 60 U/L (ref 40–150)
ALT SERPL W P-5'-P-CCNC: 20 U/L (ref 0–50)
ANION GAP SERPL CALCULATED.3IONS-SCNC: 14 MMOL/L (ref 7–15)
AST SERPL W P-5'-P-CCNC: 17 U/L (ref 0–45)
BILIRUB SERPL-MCNC: 0.7 MG/DL
BUN SERPL-MCNC: 8.7 MG/DL (ref 6–20)
CALCIUM SERPL-MCNC: 9.5 MG/DL (ref 8.8–10.4)
CHLORIDE SERPL-SCNC: 104 MMOL/L (ref 98–107)
CHOLEST SERPL-MCNC: 188 MG/DL
CREAT SERPL-MCNC: 0.62 MG/DL (ref 0.51–0.95)
EGFRCR SERPLBLD CKD-EPI 2021: >90 ML/MIN/1.73M2
FASTING STATUS PATIENT QL REPORTED: NO
FASTING STATUS PATIENT QL REPORTED: NO
FERRITIN SERPL-MCNC: 229 NG/ML (ref 6–175)
GLUCOSE SERPL-MCNC: 87 MG/DL (ref 70–99)
HCO3 SERPL-SCNC: 22 MMOL/L (ref 22–29)
HDLC SERPL-MCNC: 50 MG/DL
HPV HR 12 DNA CVX QL NAA+PROBE: NEGATIVE
HPV16 DNA CVX QL NAA+PROBE: NEGATIVE
HPV18 DNA CVX QL NAA+PROBE: NEGATIVE
HUMAN PAPILLOMA VIRUS FINAL DIAGNOSIS: NORMAL
IRON BINDING CAPACITY (ROCHE): 307 UG/DL (ref 240–430)
IRON SATN MFR SERPL: 23 % (ref 15–46)
IRON SERPL-MCNC: 70 UG/DL (ref 37–145)
LDLC SERPL CALC-MCNC: 112 MG/DL
NONHDLC SERPL-MCNC: 138 MG/DL
POTASSIUM SERPL-SCNC: 3.7 MMOL/L (ref 3.4–5.3)
PROT SERPL-MCNC: 8.2 G/DL (ref 6.4–8.3)
SODIUM SERPL-SCNC: 140 MMOL/L (ref 135–145)
TRIGL SERPL-MCNC: 128 MG/DL
TSH SERPL DL<=0.005 MIU/L-ACNC: 0.81 UIU/ML (ref 0.3–4.2)
VIT D+METAB SERPL-MCNC: 11 NG/ML (ref 20–50)

## 2025-04-30 LAB
APO B100 SERPL-MCNC: 90 MG/DL
ZINC SERPL-MCNC: 61.3 UG/DL

## 2025-05-01 LAB
BKR AP ASSOCIATED HPV REPORT: NORMAL
BKR LAB AP GYN ADEQUACY: NORMAL
BKR LAB AP GYN INTERPRETATION: NORMAL
BKR LAB AP LMP: NORMAL
BKR LAB AP PREVIOUS ABNL DX: NORMAL
BKR LAB AP PREVIOUS ABNORMAL: NORMAL
PATH REPORT.COMMENTS IMP SPEC: NORMAL
PATH REPORT.COMMENTS IMP SPEC: NORMAL
PATH REPORT.RELEVANT HX SPEC: NORMAL

## 2025-07-14 NOTE — TELEPHONE ENCOUNTER
RN cannot approve Refill Request    RN can NOT refill this medication PCP messaged that patient is overdue for Office Visit. Last office visit: 4/13/2018 David Salmon MD Last Physical: Visit date not found Last MTM visit: Visit date not found Last visit same specialty: 4/13/2018 David Salmon MD.  Next visit within 3 mo: Visit date not found  Next physical within 3 mo: Visit date not found      Ashley Howard, [facility] Triage/Med Refill 7/4/2019    Requested Prescriptions   Pending Prescriptions Disp Refills     TRI FEMYNOR 0.18/0.215/0.25 mg-35 mcg (28) Tab tablet [Pharmacy Med Name: TRI FEMYNOR 28 TABLET] 84 tablet 1     Sig: TAKE 1 TABLET DAILY AS DIRECTED. MUST SEE PROVIDER FOR PHYSICAL.       Oral Contraceptives Protocol Failed - 7/4/2019  1:41 AM        Failed - Visit with PCP or prescribing provider visit in last 12 months      Last office visit with prescriber/PCP: 4/13/2018 David Salmon MD OR same dept: Visit date not found OR same specialty: 4/13/2018 Dvaid Salmon MD  Last physical: Visit date not found Last MTM visit: Visit date not found   Next visit within 3 mo: Visit date not found  Next physical within 3 mo: Visit date not found  Prescriber OR PCP: David Salmon MD  Last diagnosis associated with med order: 1. Contraception  - TRI FEMYNOR 0.18/0.215/0.25 mg-35 mcg (28) Tab tablet [Pharmacy Med Name: TRI FEMYNOR 28 TABLET]; TAKE 1 TABLET DAILY AS DIRECTED. MUST SEE PROVIDER FOR PHYSICAL.  Dispense: 84 tablet; Refill: 1    If protocol passes may refill for 12 months if within 3 months of last provider visit (or a total of 15 months).                 Refill approved as requested.